# Patient Record
Sex: MALE | Race: WHITE
[De-identification: names, ages, dates, MRNs, and addresses within clinical notes are randomized per-mention and may not be internally consistent; named-entity substitution may affect disease eponyms.]

---

## 2023-10-24 ENCOUNTER — HOSPITAL ENCOUNTER (OUTPATIENT)
Dept: HOSPITAL 47 - RADMRIMAIN | Age: 78
Discharge: HOME | End: 2023-10-24
Attending: PHYSICAL MEDICINE & REHABILITATION
Payer: MEDICARE

## 2023-10-24 DIAGNOSIS — M47.817: Primary | ICD-10-CM

## 2023-10-24 DIAGNOSIS — M99.73: ICD-10-CM

## 2023-10-24 DIAGNOSIS — M51.36: ICD-10-CM

## 2023-10-24 DIAGNOSIS — M48.062: ICD-10-CM

## 2023-10-24 PROCEDURE — 72158 MRI LUMBAR SPINE W/O & W/DYE: CPT

## 2023-10-24 NOTE — MR
EXAMINATION TYPE: MR lumbar spine wo/w con

 

DATE OF EXAM: 10/24/2023 1:52 PM

 

CLINICAL INDICATION:Male, 78 years old with history of M54.51 VERTEBROGENIC LOW BACK PAIN; Vertebroge
dima low back pain.

 

COMPARISON: None

 

TECHNIQUE:  Multi planar, multi sequence imaging was performed utilizing: T1-weighted, T2-weighted, a
nd turbo inversion recovery imaging of the lumbar spine. 

IV Contrast: 11 cc Gadavist. (None if empty)

 

FINDINGS:  

Alignment: The lumbar vertebral bodies have preserved heights and alignment.  

 

Cord: The conus medullaris and the distal spinal cord appear unremarkable with regards to their signa
l intensity and morphology. There is no abnormal postcontrast enhancement. 

 

Bones/Discs: The spinous processes demonstrate pseudoarticulation. Degeneration changes of the spine 
with disc space narrowing osteophytes markedly changes and reactive adjoining endplate edema particul
dusty the L3-L4 during endplates. Discectomy at L2-L3. Facet joint arthropathy throughout the spine. T
here is no abnormal postcontrast enhancement. There is some reactive postcontrast enhancement around 
the right L2-L3 facet joint and to a lesser extent L2.

 

T12-L1: No evidence of significant spinal canal stenosis or neural foraminal stenosis. 

 

L1-L2: Disc bulge and facet joint arthropathy result in moderate to severe spinal canal and moderate 
to severe bilateral neural foraminal stenosis. 

 

L2-L3: Disc bulge and facet joint arthropathy result in moderate to severe spinal canal and moderate 
to severe bilateral neural foraminal stenosis. 

 

L3-L4: Disc bulge and facet joint arthropathy result in severe spinal canal and severe bilateral neur
al foraminal stenosis. 

 

L4-L5: Discectomy with facet joint arthropathy with moderate to severe bilateral neural foraminal evelyn
nosis.. 

 

L5-S1: The disc is rounded posterior morphology without significant spinal canal stenosis. Facet join
t arthropathy with severe right and moderate severe left neural foraminal stenosis. 

 

No significant spinal canal or neural foraminal stenosis in the remainder of the visualized levels.

 

Other findings:  None.

 

IMPRESSION:

1.  L3-L4 severe spinal canal stenosis with severe bilateral neural foraminal stenosis.

2.  L2-L3 and L1-L2 moderate to severe spinal canal stenosis.

3.  Multilevel disc degeneration changes with additional areas of moderate and moderate to severe berto
ral foraminal stenosis. Reactive enhancement around the right L2-L3 facet joint.

4.  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